# Patient Record
Sex: FEMALE | Race: WHITE | HISPANIC OR LATINO | URBAN - METROPOLITAN AREA
[De-identification: names, ages, dates, MRNs, and addresses within clinical notes are randomized per-mention and may not be internally consistent; named-entity substitution may affect disease eponyms.]

---

## 2017-01-21 ENCOUNTER — INPATIENT (INPATIENT)
Facility: HOSPITAL | Age: 32
LOS: 1 days | Discharge: HOME | End: 2017-01-23
Attending: OBSTETRICS & GYNECOLOGY | Admitting: OBSTETRICS & GYNECOLOGY

## 2017-01-26 DIAGNOSIS — Z3A.40 40 WEEKS GESTATION OF PREGNANCY: ICD-10-CM

## 2021-08-01 DIAGNOSIS — Z01.818 ENCOUNTER FOR OTHER PREPROCEDURAL EXAMINATION: ICD-10-CM

## 2021-08-01 PROBLEM — Z00.00 ENCOUNTER FOR PREVENTIVE HEALTH EXAMINATION: Status: ACTIVE | Noted: 2021-08-01

## 2021-08-02 ENCOUNTER — OUTPATIENT (OUTPATIENT)
Dept: OUTPATIENT SERVICES | Facility: HOSPITAL | Age: 36
LOS: 1 days | End: 2021-08-02
Payer: COMMERCIAL

## 2021-08-02 ENCOUNTER — TRANSCRIPTION ENCOUNTER (OUTPATIENT)
Age: 36
End: 2021-08-02

## 2021-08-02 ENCOUNTER — APPOINTMENT (OUTPATIENT)
Dept: DISASTER EMERGENCY | Facility: CLINIC | Age: 36
End: 2021-08-02

## 2021-08-02 VITALS
TEMPERATURE: 98 F | HEIGHT: 62 IN | WEIGHT: 130.07 LBS | OXYGEN SATURATION: 100 % | SYSTOLIC BLOOD PRESSURE: 118 MMHG | HEART RATE: 69 BPM | RESPIRATION RATE: 18 BRPM | DIASTOLIC BLOOD PRESSURE: 80 MMHG

## 2021-08-02 DIAGNOSIS — Z01.818 ENCOUNTER FOR OTHER PREPROCEDURAL EXAMINATION: ICD-10-CM

## 2021-08-02 DIAGNOSIS — Z30.2 ENCOUNTER FOR STERILIZATION: ICD-10-CM

## 2021-08-02 LAB
BASOPHILS # BLD AUTO: 0.01 K/UL — SIGNIFICANT CHANGE UP (ref 0–0.2)
BASOPHILS NFR BLD AUTO: 0.2 % — SIGNIFICANT CHANGE UP (ref 0–2)
EOSINOPHIL # BLD AUTO: 0.09 K/UL — SIGNIFICANT CHANGE UP (ref 0–0.5)
EOSINOPHIL NFR BLD AUTO: 1.8 % — SIGNIFICANT CHANGE UP (ref 0–6)
HCT VFR BLD CALC: 44.1 % — SIGNIFICANT CHANGE UP (ref 34.5–45)
HGB BLD-MCNC: 14.5 G/DL — SIGNIFICANT CHANGE UP (ref 11.5–15.5)
IMM GRANULOCYTES NFR BLD AUTO: 0.2 % — SIGNIFICANT CHANGE UP (ref 0–1.5)
LYMPHOCYTES # BLD AUTO: 2.07 K/UL — SIGNIFICANT CHANGE UP (ref 1–3.3)
LYMPHOCYTES # BLD AUTO: 40.6 % — SIGNIFICANT CHANGE UP (ref 13–44)
MCHC RBC-ENTMCNC: 28.9 PG — SIGNIFICANT CHANGE UP (ref 27–34)
MCHC RBC-ENTMCNC: 32.9 GM/DL — SIGNIFICANT CHANGE UP (ref 32–36)
MCV RBC AUTO: 87.8 FL — SIGNIFICANT CHANGE UP (ref 80–100)
MONOCYTES # BLD AUTO: 0.36 K/UL — SIGNIFICANT CHANGE UP (ref 0–0.9)
MONOCYTES NFR BLD AUTO: 7.1 % — SIGNIFICANT CHANGE UP (ref 2–14)
NEUTROPHILS # BLD AUTO: 2.56 K/UL — SIGNIFICANT CHANGE UP (ref 1.8–7.4)
NEUTROPHILS NFR BLD AUTO: 50.1 % — SIGNIFICANT CHANGE UP (ref 43–77)
PLATELET # BLD AUTO: 344 K/UL — SIGNIFICANT CHANGE UP (ref 150–400)
RBC # BLD: 5.02 M/UL — SIGNIFICANT CHANGE UP (ref 3.8–5.2)
RBC # FLD: 13.5 % — SIGNIFICANT CHANGE UP (ref 10.3–14.5)
SARS-COV-2 RNA SPEC QL NAA+PROBE: SIGNIFICANT CHANGE UP
WBC # BLD: 5.1 K/UL — SIGNIFICANT CHANGE UP (ref 3.8–10.5)
WBC # FLD AUTO: 5.1 K/UL — SIGNIFICANT CHANGE UP (ref 3.8–10.5)

## 2021-08-02 PROCEDURE — 86850 RBC ANTIBODY SCREEN: CPT

## 2021-08-02 PROCEDURE — G0463: CPT | Mod: 25

## 2021-08-02 PROCEDURE — 86900 BLOOD TYPING SEROLOGIC ABO: CPT

## 2021-08-02 PROCEDURE — 85025 COMPLETE CBC W/AUTO DIFF WBC: CPT

## 2021-08-02 PROCEDURE — 86901 BLOOD TYPING SEROLOGIC RH(D): CPT

## 2021-08-02 PROCEDURE — 36415 COLL VENOUS BLD VENIPUNCTURE: CPT

## 2021-08-02 PROCEDURE — U0003: CPT

## 2021-08-02 PROCEDURE — U0005: CPT

## 2021-08-02 NOTE — H&P PST ADULT - NSICDXFAMILYHX_GEN_ALL_CORE_FT
If you have more nausea or vomiting, you may try taking the Zofran medication.  Take the Protonix medication every day to reduce stomach acid, this should help your pain.  Please follow closely with Dr. Mauro this week if your symptoms are not improving.  If your pain again becomes severe, you have fever, or you vomit recurrently, you must go back to the emergency room for further evaluation.  This [facility] at [address] In [place] may may be less busy, consider going there.    Patient Education   Dolor epigástrico, causa incierta [Epigastric Pain, Uncertain Cause]  El dolor epigástrico puede ser un signo de enfermedad localizada en la parte superior del abdomen. Las causas frecuentes incluyen:     · Reflujo ácido (el ácido del estómago sube hacia el esófago)  · Gastritis (irritación del revestimiento del estómago)  · Úlcera péptica  · Inflamación del páncreas  · Cálculos (piedras) biliares  · Infección en la vesícula biliar  El dolor puede ser sordo o quemante. Puede irradiarse hacia el pecho o la espalda. Puede rubén otros síntomas alicia eructos, sensación de inflamiento en el estómago, cólicos o dolor de hambre. Puede rubén pérdida de peso o poco apetito, náuseas o vómito.  Puesto que el diagnóstico de mccoy dolor es incierto, algunas veces se necesitarán pruebas adicionales. En algunos casos el médico tratará la enfermedad más probable para padilla si mejora antes de hacer pruebas adicionales.  Cuidados en el hogar  Medicamentos  · Los antiácidos ayudan a neutralizar los ácidos normales en el estómago. Ejemplos de estos son Maalox, Mylanta, Rolaids y Tums. Si no le gusta el líquido, puede ensayar las formas masticables. Es posible que note que algunos le funcionan mejor que otros. El uso excesivo puede provocar diarrea o estreñimiento.  · Los bloqueadores de ácido (bloqueadores H2) disminuyen la producción de ácido. Ejemplos de esto son cimetidina (Tagamet), famotidina (Pepcid) y  ranitidina (Zantac).  · Los inhibidores la bomba de protones (PPI) disminuyen la producción de ácido de teagan manera diferente que los bloqueadores. Usted puede encontrar que funcionan mejor, radha tardan un poco más en surtir efecto. Ejemplos de estos son omeprazole (Prilosec), lansoprazole (Prevacid), rabeprazole (Aciphex) y esomeprazole (Nexium).  · [place] un antiácido de 30 a 60 minutos después de comer y a la hora de acostarse, radha no a la misma hora que un bloqueador de ácido (de la bomba de protones).  · Trate de no devyn antiinflamatorios no esteroides (DENNIS). La aspirina también puede provocar problemas, radha si la augusto para el corazón u otra razón médica, hable con mccoy médico antes de suspenderla; usted no quisiera causar un problema peor, alicia un ataque cardíaco o un ataque cerebral.  Dieta  · Si ciertos alientos parecen provocar el espasmo, trate de evitarlos.  · Coma despacio y mastique krishna los alimentos antes de tragarlos. Los síntomas de la gastritis pueden empeorar con ciertas comidas. Limite las comidas grasosas, fritas y muy condimentadas, así alicia el café, el chocolate, las mentas y los alientos con alto contenido de ácido alicia los tomates y las frutas y jugo cítricos (naranja, toronja, bob).  · Evite el alcohol, la cafeína y el tabaco, los cuales pueden retrasar la sanación y empeorar mccoy problema.  · Trate de comer comidas pequeñas con bocadillos entre teagan y otra.  Cuidados de seguimiento  Mary un seguimiento con mccoy proveedor de atención médica, o alicia le indiquen.  Cuándo buscar consejo médico  Llame de inmediato a mccoy proveedor de atención médica si algo de lo siguiente ocurre:  · El dolor de estómago empeora o se mueve hacia la parte derecha inferior del abdomen  · Aparece dolor de pecho, o si el dolor empeora o se extiende hacia el pecho, la espalda, el aimee, el hombro o el brazo  · Vómito frecuente (no puede retener los líquidos en mccoy estómago)  · Keenan en las heces o en el vómito (color  rojizo o negruzco)  · Siente debilidad o mareos, se desmaya o tiene problemas para respirar  · Fiebre de 100.4°F (38°C) o más latia, o alicia le haya indicado mccoy proveedor de atención médica.  · Hinchazón abdominal     Date Last Reviewed: 9/25/2015  © 8110-1894 [employer]. [address] [place] [address], [place] [address]. Todos los derechos reservados. Esta información no pretende sustituir la atención médica profesional. Sólo mccoy médico puede diagnosticar y tratar un problema de werner.            FAMILY HISTORY:  No pertinent family history in first degree relatives

## 2021-08-02 NOTE — H&P PST ADULT - HISTORY OF PRESENT ILLNESS
36 y/o female scheduled for Laparoscopic b/l salpingectomy. Pt stated "I do not want any more kids."

## 2021-08-02 NOTE — H&P PST ADULT - NSICDXPASTMEDICALHX_GEN_ALL_CORE_FT
PAST MEDICAL HISTORY:  COVID-19 vaccine series completed Pfizer - 2nd dose 07/06/2021    Request for sterilization

## 2021-08-02 NOTE — H&P PST ADULT - ASSESSMENT
36 y/o female scheduled for Laparoscopic b/l salpingectomy. Pt stated "I do not want any more kids."   Plan  1. Stop all NSAIDS, herbal supplements and vitamins for 7 days.  2. NPO as per ASU instructions.  3. COVID test done today.  4. Use EZ sponges as directed  5. Labs done today, STAT urine pregnancy test on admission.

## 2021-08-02 NOTE — H&P PST ADULT - SKIN/BREAST
negative
I have personally seen and examined this patient.  I have fully participated in the care of this patient. I have reviewed all pertinent clinical information, including history, physical exam, plan and the Resident’s note and agree except as noted.

## 2021-08-03 DIAGNOSIS — Z30.2 ENCOUNTER FOR STERILIZATION: ICD-10-CM

## 2021-08-03 DIAGNOSIS — Z01.818 ENCOUNTER FOR OTHER PREPROCEDURAL EXAMINATION: ICD-10-CM

## 2021-08-04 RX ORDER — FENTANYL CITRATE 50 UG/ML
50 INJECTION INTRAVENOUS
Refills: 0 | Status: DISCONTINUED | OUTPATIENT
Start: 2021-08-05 | End: 2021-08-05

## 2021-08-04 RX ORDER — ONDANSETRON 8 MG/1
4 TABLET, FILM COATED ORAL ONCE
Refills: 0 | Status: DISCONTINUED | OUTPATIENT
Start: 2021-08-05 | End: 2021-08-05

## 2021-08-04 RX ORDER — SODIUM CHLORIDE 9 MG/ML
1000 INJECTION, SOLUTION INTRAVENOUS
Refills: 0 | Status: DISCONTINUED | OUTPATIENT
Start: 2021-08-05 | End: 2021-08-05

## 2021-08-04 RX ORDER — OXYCODONE HYDROCHLORIDE 5 MG/1
5 TABLET ORAL ONCE
Refills: 0 | Status: DISCONTINUED | OUTPATIENT
Start: 2021-08-05 | End: 2021-08-05

## 2021-08-05 ENCOUNTER — OUTPATIENT (OUTPATIENT)
Dept: INPATIENT UNIT | Facility: HOSPITAL | Age: 36
LOS: 1 days | Discharge: HOME | End: 2021-08-05
Payer: COMMERCIAL

## 2021-08-05 ENCOUNTER — RESULT REVIEW (OUTPATIENT)
Age: 36
End: 2021-08-05

## 2021-08-05 VITALS
HEART RATE: 70 BPM | SYSTOLIC BLOOD PRESSURE: 110 MMHG | OXYGEN SATURATION: 100 % | RESPIRATION RATE: 13 BRPM | DIASTOLIC BLOOD PRESSURE: 71 MMHG | TEMPERATURE: 98 F

## 2021-08-05 VITALS
SYSTOLIC BLOOD PRESSURE: 111 MMHG | TEMPERATURE: 98 F | HEART RATE: 76 BPM | WEIGHT: 123.9 LBS | DIASTOLIC BLOOD PRESSURE: 80 MMHG | OXYGEN SATURATION: 99 % | RESPIRATION RATE: 16 BRPM | HEIGHT: 62 IN

## 2021-08-05 DIAGNOSIS — Z30.2 ENCOUNTER FOR STERILIZATION: ICD-10-CM

## 2021-08-05 LAB — HCG UR QL: NEGATIVE — SIGNIFICANT CHANGE UP

## 2021-08-05 PROCEDURE — 81025 URINE PREGNANCY TEST: CPT

## 2021-08-05 PROCEDURE — 88302 TISSUE EXAM BY PATHOLOGIST: CPT | Mod: 26

## 2021-08-05 PROCEDURE — 88302 TISSUE EXAM BY PATHOLOGIST: CPT

## 2021-08-05 RX ORDER — IBUPROFEN 200 MG
1 TABLET ORAL
Qty: 30 | Refills: 1
Start: 2021-08-05 | End: 2021-10-03

## 2021-08-05 RX ORDER — CETIRIZINE HYDROCHLORIDE 10 MG/1
1 TABLET ORAL
Qty: 0 | Refills: 0 | DISCHARGE

## 2021-08-05 RX ADMIN — SODIUM CHLORIDE 125 MILLILITER(S): 9 INJECTION, SOLUTION INTRAVENOUS at 09:06

## 2021-08-05 NOTE — ASU DISCHARGE PLAN (ADULT/PEDIATRIC) - ASU DC SPECIAL INSTRUCTIONSFT
Expect to have brown vaginal discharge from iodine cleanser.  Expect to have orange color on your skin from the surgical cleanser- you may want to wash this off at home today.  Your incisions have tape on them.  You may wash and bathe with this tape until you return to the office in seven days for your postop check.

## 2021-08-05 NOTE — BRIEF OPERATIVE NOTE - OPERATION/FINDINGS
1.  Three laparoscopic ports placed in the abdomen/pelvis  2.  Grossly normal pelvic anatomy noted  3.  Segments of fallopian tubes excised with Ligasure device, bilaterally

## 2021-08-05 NOTE — ASU DISCHARGE PLAN (ADULT/PEDIATRIC) - CARE PROVIDER_API CALL
Roberot Sarmiento  OBSTETRICS AND GYNECOLOGY  [address] [facility], [address] [place] [address]  Phone: [phone]  Fax: [phone]  Established Patient  Scheduled Appointment: 08/12/2021

## 2022-06-01 ENCOUNTER — NON-APPOINTMENT (OUTPATIENT)
Age: 37
End: 2022-06-01
